# Patient Record
Sex: FEMALE | URBAN - METROPOLITAN AREA
[De-identification: names, ages, dates, MRNs, and addresses within clinical notes are randomized per-mention and may not be internally consistent; named-entity substitution may affect disease eponyms.]

---

## 2023-11-20 ENCOUNTER — APPOINTMENT (RX ONLY)
Dept: URBAN - METROPOLITAN AREA CLINIC 82 | Facility: CLINIC | Age: 33
Setting detail: DERMATOLOGY
End: 2023-11-20

## 2023-11-20 DIAGNOSIS — L30.2 CUTANEOUS AUTOSENSITIZATION: ICD-10-CM

## 2023-11-20 DIAGNOSIS — L259 CONTACT DERMATITIS AND OTHER ECZEMA, UNSPECIFIED CAUSE: ICD-10-CM

## 2023-11-20 PROBLEM — L23.9 ALLERGIC CONTACT DERMATITIS, UNSPECIFIED CAUSE: Status: ACTIVE | Noted: 2023-11-20

## 2023-11-20 PROCEDURE — ? COUNSELING

## 2023-11-20 PROCEDURE — ? TREATMENT REGIMEN

## 2023-11-20 PROCEDURE — ? PRESCRIPTION

## 2023-11-20 PROCEDURE — 99203 OFFICE O/P NEW LOW 30 MIN: CPT

## 2023-11-20 RX ORDER — TRIAMCINOLONE ACETONIDE 1 MG/G
CREAM TOPICAL
Qty: 30 | Refills: 1 | Status: ERX | COMMUNITY
Start: 2023-11-20

## 2023-11-20 RX ADMIN — TRIAMCINOLONE ACETONIDE: 1 CREAM TOPICAL at 00:00

## 2023-11-20 ASSESSMENT — LOCATION SIMPLE DESCRIPTION DERM
LOCATION SIMPLE: LEFT FOREARM
LOCATION SIMPLE: RIGHT PRETIBIAL REGION
LOCATION SIMPLE: RIGHT FOREARM
LOCATION SIMPLE: LEFT PRETIBIAL REGION

## 2023-11-20 ASSESSMENT — LOCATION DETAILED DESCRIPTION DERM
LOCATION DETAILED: LEFT DISTAL PRETIBIAL REGION
LOCATION DETAILED: RIGHT DISTAL PRETIBIAL REGION
LOCATION DETAILED: RIGHT PROXIMAL DORSAL FOREARM
LOCATION DETAILED: LEFT PROXIMAL DORSAL FOREARM

## 2023-11-20 ASSESSMENT — LOCATION ZONE DERM
LOCATION ZONE: ARM
LOCATION ZONE: LEG

## 2023-11-20 NOTE — HPI: RASH
How Severe Is Your Rash?: moderate
Is This A New Presentation, Or A Follow-Up?: Rash
Additional History: Pt reports that she is from [place] and lives in [place] for the winter. Her rash flared up after coming to [place]. She is unsure if it is related to the climate.

## 2023-11-20 NOTE — PROCEDURE: TREATMENT REGIMEN
Detail Level: Zone
Initiate Treatment: Triamcinolone twice daily for 2 weeks then no more than 2-3 times weekly prn flares